# Patient Record
(demographics unavailable — no encounter records)

---

## 2024-10-28 NOTE — PHYSICAL EXAM
[General Appearance - Alert] : alert [Sclera] : the sclera and conjunctiva were normal [Outer Ear] : the ears and nose were normal in appearance [Neck Appearance] : the appearance of the neck was normal [Neck Cervical Mass (___cm)] : no neck mass was observed [] : no respiratory distress [Respiration, Rhythm And Depth] : normal respiratory rhythm and effort [Exaggerated Use Of Accessory Muscles For Inspiration] : no accessory muscle use [Apical Impulse] : the apical impulse was normal [Heart Sounds] : normal S1 and S2 [Heart Rate And Rhythm] : heart rate was normal and rhythm regular [Edema] : there was no peripheral edema [Bowel Sounds] : normal bowel sounds [Abdomen Soft] : soft [Abdomen Tenderness] : non-tender [Cervical Lymph Nodes Enlarged Posterior Bilaterally] : posterior cervical [Cervical Lymph Nodes Enlarged Anterior Bilaterally] : anterior cervical [Supraclavicular Lymph Nodes Enlarged Bilaterally] : supraclavicular [No CVA Tenderness] : no ~M costovertebral angle tenderness [No Spinal Tenderness] : no spinal tenderness [Abnormal Walk] : normal gait [Musculoskeletal - Swelling] : no joint swelling seen [Skin Color & Pigmentation] : normal skin color and pigmentation [Oriented To Time, Place, And Person] : oriented to person, place, and time

## 2024-10-28 NOTE — PHYSICAL EXAM
[Sclera] : the sclera and conjunctiva were normal [General Appearance - Alert] : alert [Outer Ear] : the ears and nose were normal in appearance [Neck Appearance] : the appearance of the neck was normal [Neck Cervical Mass (___cm)] : no neck mass was observed [] : no respiratory distress [Respiration, Rhythm And Depth] : normal respiratory rhythm and effort [Exaggerated Use Of Accessory Muscles For Inspiration] : no accessory muscle use [Apical Impulse] : the apical impulse was normal [Heart Sounds] : normal S1 and S2 [Heart Rate And Rhythm] : heart rate was normal and rhythm regular [Edema] : there was no peripheral edema [Bowel Sounds] : normal bowel sounds [Abdomen Soft] : soft [Abdomen Tenderness] : non-tender [Cervical Lymph Nodes Enlarged Posterior Bilaterally] : posterior cervical [Cervical Lymph Nodes Enlarged Anterior Bilaterally] : anterior cervical [Supraclavicular Lymph Nodes Enlarged Bilaterally] : supraclavicular [No CVA Tenderness] : no ~M costovertebral angle tenderness [No Spinal Tenderness] : no spinal tenderness [Abnormal Walk] : normal gait [Musculoskeletal - Swelling] : no joint swelling seen [Skin Color & Pigmentation] : normal skin color and pigmentation [Oriented To Time, Place, And Person] : oriented to person, place, and time

## 2024-10-29 NOTE — REVIEW OF SYSTEMS
[Eyesight Problems] : eyesight problems [SOB on Exertion] : shortness of breath during exertion [Constipation] : constipation [Heartburn] : heartburn [Hesitancy] : urinary hesitancy [Nocturia] : nocturia [Arthralgias] : arthralgias [Muscle Weakness] : muscle weakness [Fever] : no fever [Chills] : no chills [Feeling Poorly] : not feeling poorly [Feeling Tired] : not feeling tired [Recent Weight Loss (___ Lbs)] : no recent weight loss [Eye Pain] : no eye pain [Red Eyes] : eyes not red [Earache] : no earache [Loss Of Hearing] : no hearing loss [Heart Rate Is Fast] : the heart rate was not fast [Palpitations] : no palpitations [Leg Claudication] : no intermittent leg claudication [Lower Ext Edema] : no extremity edema [Shortness Of Breath] : no shortness of breath [Wheezing] : no wheezing [Diarrhea] : no diarrhea [Itching] : no itching [Dry Skin] : no dry skin [Dizziness] : no dizziness [Fainting] : no fainting [Limb Weakness] : no limb weakness [Difficulty Walking] : no difficulty walking [Sleep Disturbances] : no sleep disturbances [Anxiety] : no anxiety [Easy Bleeding] : no tendency for easy bleeding [Easy Bruising] : no tendency for easy bruising [FreeTextEntry4] : vertigo

## 2024-10-29 NOTE — HISTORY OF PRESENT ILLNESS
[FreeTextEntry1] : A case of CKD stage III with hypertension, fibromyalgia, backache, depression, nocturia, and BPH has come for follow-up. April 29, 2024 Patient feels comfortable. No change in health status. Patient complains of burning in micturition. Patient lost 1 lb. BP is on lower side (103/73 mm Hg). Advised renal panel, CBC, lipid profile, A1C, urine analysis and urine culture. Lab tests were discussed with the patient. There is a mild increase in serum creatinine to 1.72 mg/dl with a decrease in GFR to 45 ml/min. HDL 38 mg/dl. A decrease in GFR may be secondary to low BP. Advised repeat BMP after one month. October 28, 2024 Patient has gained and under lots of stress because of family problems.

## 2024-10-29 NOTE — ASSESSMENT
[FreeTextEntry1] : A case of CKD stage III with hypertension, fibromyalgia, backache, depression, nocturia, and BPH has come for follow-up. April 29, 2024 Patient feels comfortable. No change in health status. Patient complains of burning in micturition. Patient lost 1 lb. BP is on lower side (103/73 mm Hg). Advised renal panel, CBC, lipid profile, A1C, urine analysis and urine culture. Lab tests were discussed with the patient. There is a mild increase in serum creatinine to 1.72 mg/dl with a decrease in GFR to 45 ml/min. HDL 38 mg/dl. A decrease in GFR may be secondary to low BP. Advised repeat BMP after one month. October 28, 2024 Patient has gained and under lots of stress because of family problems.  Patient has gained 13 lbs. BP is controlled. No pedal edema. Advised renal panel, CBC, A1C, lipid profile, urine protein creatinine ratio and urine analysis.  Lab results were discussed with the patient. There is an increase in serum creatinine to 1.96 mg/dl with a decrease in GFR to 38 ml/min. Other markers in urine analysis, CBC, and lipid profile within acceptable range. Patient is not any medications to slow the progression of CKD. Patient wants to give a trial of weight reduction within a month before using any meds.

## 2025-04-24 NOTE — PHYSICAL EXAM
[General Appearance - Alert] : alert [Sclera] : the sclera and conjunctiva were normal [Outer Ear] : the ears and nose were normal in appearance [Neck Appearance] : the appearance of the neck was normal [Neck Cervical Mass (___cm)] : no neck mass was observed [] : no respiratory distress [Respiration, Rhythm And Depth] : normal respiratory rhythm and effort [Exaggerated Use Of Accessory Muscles For Inspiration] : no accessory muscle use [Apical Impulse] : the apical impulse was normal [Heart Rate And Rhythm] : heart rate was normal and rhythm regular [Heart Sounds] : normal S1 and S2 [Edema] : there was no peripheral edema [Bowel Sounds] : normal bowel sounds [Abdomen Soft] : soft [Abdomen Tenderness] : non-tender [Cervical Lymph Nodes Enlarged Posterior Bilaterally] : posterior cervical [Cervical Lymph Nodes Enlarged Anterior Bilaterally] : anterior cervical [Supraclavicular Lymph Nodes Enlarged Bilaterally] : supraclavicular [No CVA Tenderness] : no ~M costovertebral angle tenderness [No Spinal Tenderness] : no spinal tenderness [Abnormal Walk] : normal gait [Musculoskeletal - Swelling] : no joint swelling seen [Skin Color & Pigmentation] : normal skin color and pigmentation [Oriented To Time, Place, And Person] : oriented to person, place, and time

## 2025-04-25 NOTE — HISTORY OF PRESENT ILLNESS
[FreeTextEntry1] : A case of CKD stage III with hypertension, fibromyalgia, backache, depression, nocturia, and BPH has come for follow-up. April 29, 2024 Patient feels comfortable. No change in health status. Patient complains of burning in micturition. Patient lost 1 lb. BP is on lower side (103/73 mm Hg). Advised renal panel, CBC, lipid profile, A1C, urine analysis and urine culture. Lab tests were discussed with the patient. There is a mild increase in serum creatinine to 1.72 mg/dl with a decrease in GFR to 45 ml/min. HDL 38 mg/dl. A decrease in GFR may be secondary to low BP. Advised repeat BMP after one month. October 28, 2024 Patient has gained and under lots of stress because of family problems. Patient has gained 13 lbs. BP is controlled. No pedal edema. Advised renal panel, CBC, A1C, lipid profile, urine protein creatinine ratio and urine analysis. Lab results were discussed with the patient. There is an increase in serum creatinine to 1.96 mg/dl with a decrease in GFR to 38 ml/min. Other markers in urine analysis, CBC, and lipid profile within acceptable range. Patient is not any medications to slow the progression of CKD. Patient wants to give a trial of weight reduction within a month before using any meds. Aprill 24, 2025 Patient has gained 7 lbs.

## 2025-04-25 NOTE — ASSESSMENT
[FreeTextEntry1] : A case of CKD stage III with hypertension, fibromyalgia, backache, depression, nocturia, and BPH has come for follow-up. April 29, 2024 Patient feels comfortable. No change in health status. Patient complains of burning in micturition. Patient lost 1 lb. BP is on lower side (103/73 mm Hg). Advised renal panel, CBC, lipid profile, A1C, urine analysis and urine culture. Lab tests were discussed with the patient. There is a mild increase in serum creatinine to 1.72 mg/dl with a decrease in GFR to 45 ml/min. HDL 38 mg/dl. A decrease in GFR may be secondary to low BP. Advised repeat BMP after one month. October 28, 2024 Patient has gained and under lots of stress because of family problems. Patient has gained 13 lbs. BP is controlled. No pedal edema. Advised renal panel, CBC, A1C, lipid profile, urine protein creatinine ratio and urine analysis. Lab results were discussed with the patient. There is an increase in serum creatinine to 1.96 mg/dl with a decrease in GFR to 38 ml/min. Other markers in urine analysis, CBC, and lipid profile within acceptable range. Patient is not any medications to slow the progression of CKD. Patient wants to give a trial of weight reduction within a month before using any meds. Aprill 24, 2025 Patient has gained 7 lbs. BP is controlled. No pedal edema. Advised renal panel, A1C, lipid profile, TSH, urine analysis. Lab results were discussed with the patient. Renal functions were stable. There was borderline elevation of serum LDL, 104 mg/dl. Urine analysis was wihitn an acceptable range. RTC six months.

## 2025-07-11 NOTE — PHYSICAL EXAM
[Nystagmus] : ~T ~M nystagmus was seen [Chinyere-Alexsanderke] : [place]-Hallpike: Positive [Normal] : mucosa is normal [Midline] : trachea located in midline position

## 2025-07-11 NOTE — PHYSICAL EXAM
[Nystagmus] : ~T ~M nystagmus was seen [Chinyere-Alexsanderke] : [place]-Hallpike: Positive [Normal] : mucosa is normal [Midline] : trachea located in midline position

## 2025-07-14 NOTE — DATA REVIEWED
[de-identified] : An audiogram was ordered and performed including pure tones, tympanometry and speech testing for the patients complaint of hearing loss I have independently reviewed the patient's audiogram from today and my findings include  AU Hearing -2k hz sloping to moderate-severe SNHL through 8k hz. AD Tymp CNS. AS Tymp As

## 2025-07-14 NOTE — ASSESSMENT
[FreeTextEntry1] : 62 year M presents with Dizziness, Bilateral SNHL with Right worse than left   No patterns of low frequency or asymmetrical SNHL on audiogram. Chinyere Alcazar-pike negative. Fukuda Step Test Negative. Negative Romberg. Patient denies acute history of recent viral infection or history of headaches or migraines. Denies fullness in the ear or fluctuations in hearing during episodes   Personally reviewed audiogram shows AU Hearing -2k hz sloping to moderate-severe SNHL through 8k hz. AD Tymp CNS. AS Tymp As   Based on clinical history and physical exam Right BPPV. Epley Maneuver in office   Recommend Benign Paroxysmal Positional Vertigo -Discussed with patient that BPPV causes brief episodes of mild to intense dizziness. It is usually triggered by specific changes head position. This might occur when you tip your head up or down, when you lie down, or when you turn over or sit up in bed. -BPPV occurs when crystals within the otolith organs in your inner ear which monitor head's movements become dislodged. When they become dislodged, they can move into one of the semicircular canals. This causes the semicircular canal to become sensitive to head position changes it would normally not respond to, which is what triggers the sense of disequilibrium. -The Epley Maneuver is a crystal repositioning procedure that moves the crystals from the fluid-filled semicircular canals of your inner ear into a tiny baglike open area (vestibule) that houses one of the otolith organs in your ear, where these particles don't cause trouble and are more easily resorbed. -Epley Maneuver Handout Given -Vasiliy's Head Exercise Handout Given -Order Given for Vestibular Rehabilitation Therapy to patient can learn at home how to reposition when these acute episodes of BPPV occur.   Bilateral SNHL with Right worse than left -Discussed Benefit of Hearings Aids and their value of helping keep brain stimulated by helping hear conversation which keeps a person active and socially connected. Stressed also the association with a lower risk of incident dementia and slower cognitive decline. -Discussed extensively that asymmetrical hearing loss can be associate many etiologies one of which may be a small lesions within the nerve of hearing. An MRI Brain/IAC can usually  these lesion. Majority of the time even if there is a lesion we would monitor the lesion with serial MRI to follow its growth. -Patient states would NOT like to pursue the MRI Brain/IAC at this time and would rather monitor hearing and tinnitus and if symptoms change or audiogram change she would then like to do MRI -Patient states hearing is functional not interested in any hearing aids at this time.    -Return to clinic in 4 months or sooner if new/worsen symptoms present

## 2025-07-14 NOTE — DATA REVIEWED
[de-identified] : An audiogram was ordered and performed including pure tones, tympanometry and speech testing for the patients complaint of hearing loss I have independently reviewed the patient's audiogram from today and my findings include  AU Hearing -2k hz sloping to moderate-severe SNHL through 8k hz. AD Tymp CNS. AS Tymp As